# Patient Record
Sex: MALE | Race: WHITE | NOT HISPANIC OR LATINO | URBAN - NONMETROPOLITAN AREA
[De-identification: names, ages, dates, MRNs, and addresses within clinical notes are randomized per-mention and may not be internally consistent; named-entity substitution may affect disease eponyms.]

---

## 2021-11-01 ENCOUNTER — IMPORTED ENCOUNTER (OUTPATIENT)
Dept: URBAN - NONMETROPOLITAN AREA CLINIC 1 | Facility: CLINIC | Age: 71
End: 2021-11-01

## 2021-11-01 PROBLEM — H25.813: Noted: 2021-11-01

## 2021-11-01 PROCEDURE — 99204 OFFICE O/P NEW MOD 45 MIN: CPT

## 2021-11-01 NOTE — PATIENT DISCUSSION
Cataract(s)-Visually significant cataract OU .-Cataract(s) causing symptomatic impairment of visual function not correctable with a tolerable change in glasses or contact lenses lighting or non-operative means resulting in specific activity limitations and/or participation restrictions including but not limited to reading viewing television driving or meeting vocational or recreational needs. -Expectation is clearer vision and functional improvement in symptoms as well as reduced glare disability after cataract removal.-Order IOLMaster and OPD today. -Recommend LRI OD/TORIC OS  based on today's OPD testing and lifestyle questionnaire.-All questions were answered regarding surgery including pre and post-op medications appointments activity restrictions and anesthetic usage.-The risks benefits and alternatives and special risk factors for the patient were discussed in detail including but not limited to: bleeding infection retinal detachment vitreous loss problems with the implant and possible need for additional surgery.-Although rare the possibility of complete vision loss was discussed.-The possible need for glasses post-operatively was discussed.-Order medical clearance exam based on history of cancer -Patient elects to proceed with cataract surgery OS . Will schedule at patient's convenience and re-evaluate OD  in the future. Discussed lens & based off todays testing pt qualifies for LRI OD /Toric OS. Explained lens benefits & advised pt the need for reading gls. Start AT & Lotemax TID OU x 1 week and RTC for repeat rocael OU. Drops given today w/ instructions Pt elects LenSX OU Post op inflammation anticipated discussed dextenzax insertiona fter surgery.

## 2021-11-18 ENCOUNTER — IMPORTED ENCOUNTER (OUTPATIENT)
Dept: URBAN - NONMETROPOLITAN AREA CLINIC 1 | Facility: CLINIC | Age: 71
End: 2021-11-18

## 2021-11-18 PROBLEM — A15.9: Noted: 2021-11-18

## 2021-11-18 PROBLEM — Z01.818: Noted: 2021-11-18

## 2021-12-03 ENCOUNTER — IMPORTED ENCOUNTER (OUTPATIENT)
Dept: URBAN - NONMETROPOLITAN AREA CLINIC 1 | Facility: CLINIC | Age: 71
End: 2021-12-03

## 2021-12-03 PROBLEM — H25.811: Noted: 2021-12-03

## 2021-12-03 PROBLEM — Z98.42: Noted: 2021-12-03

## 2021-12-03 PROCEDURE — 99024 POSTOP FOLLOW-UP VISIT: CPT

## 2021-12-03 PROCEDURE — 66999 UNLISTED PX ANT SEGMENT EYE: CPT

## 2021-12-03 PROCEDURE — 92136 OPHTHALMIC BIOMETRY: CPT

## 2021-12-03 PROCEDURE — 0356T INSERTION OF DRUG-ELUTING IMPLANT (INCLUDING PUNCTAL DILATION AND IMPLANT REMOVAL WHEN PERFORMED) INTO LACRIMAL CANALICULUS, EACH: CPT

## 2021-12-03 PROCEDURE — 66984 XCAPSL CTRC RMVL W/O ECP: CPT

## 2021-12-03 NOTE — PATIENT DISCUSSION
s/p PCIOL-Pt doing well s/p PCIOL. -Continue post-op gtts according to instruction sheet and sleep with eye shield over eye for 7 nights.-Avoid bending at the waist lifting anything over 5lbs and dirty or neal environments. - IOP at 32 OS start Combigan BID OS sample given today. -RTC .

## 2021-12-06 ENCOUNTER — IMPORTED ENCOUNTER (OUTPATIENT)
Dept: URBAN - NONMETROPOLITAN AREA CLINIC 1 | Facility: CLINIC | Age: 71
End: 2021-12-06

## 2021-12-06 PROCEDURE — 99024 POSTOP FOLLOW-UP VISIT: CPT

## 2021-12-06 NOTE — PATIENT DISCUSSION
Cataract(s)-Visually significant cataract OD . -Cataract(s) causing symptomatic impairment of visual function not correctable with a tolerable change in glasses or contact lenses lighting or non-operative means resulting in specific activity limitations and/or participation restrictions including but not limited to reading viewing television driving or meeting vocational or recreational needs. -Expectation is clearer vision and functional improvement in symptoms as well as reduced glare disability after cataract removal.-Recommend LRI based on previous OPD testing and lifestyle questionnaire.-All questions were answered regarding surgery including pre and post-op medications appointments activity restrictions and anesthetic usage.-The risks benefits and alternatives and special risk factors for the patient were discussed in detail including but not limited to: bleeding infection retinal detachment vitreous loss problems with the implant and possible need for additional surgery.-Although rare the possibility of complete vision loss was discussed.-The need for glasses post-operatively was discussed.-Patient elects to proceed with cataract surgery OD . Will schedule at patient's convenience. s/p PCIOL CE OS LRI/LenSx -Pt doing well s/p PCIOL. -Continue post-op gtts according to instruction sheet.-Okay to resume usual activites and d/c eye shield.

## 2021-12-13 PROBLEM — Z98.42: Noted: 2021-12-13

## 2021-12-13 PROBLEM — H25.811: Noted: 2021-12-13

## 2021-12-17 ENCOUNTER — IMPORTED ENCOUNTER (OUTPATIENT)
Dept: URBAN - NONMETROPOLITAN AREA CLINIC 1 | Facility: CLINIC | Age: 71
End: 2021-12-17

## 2021-12-17 PROBLEM — Z98.41: Noted: 2021-12-17

## 2021-12-17 PROBLEM — Z98.42: Noted: 2021-12-13

## 2021-12-17 PROCEDURE — 66999 UNLISTED PX ANT SEGMENT EYE: CPT

## 2021-12-17 PROCEDURE — 92136 OPHTHALMIC BIOMETRY: CPT

## 2021-12-17 PROCEDURE — 99024 POSTOP FOLLOW-UP VISIT: CPT

## 2021-12-17 PROCEDURE — 0356T INSERTION OF DRUG-ELUTING IMPLANT (INCLUDING PUNCTAL DILATION AND IMPLANT REMOVAL WHEN PERFORMED) INTO LACRIMAL CANALICULUS, EACH: CPT

## 2021-12-17 PROCEDURE — 66984 XCAPSL CTRC RMVL W/O ECP: CPT

## 2021-12-17 NOTE — PATIENT DISCUSSION
Same day POV CE OD 12/17/21 POV CE OS 12/3/21 LRI/LenSx+Dextenza OU-Pt doing well s/p PCIOL. -Continue post-op gtts according to instruction sheet and sleep with eye shield over eye for 7 nights.-Avoid bending at the waist lifting anything over 5lbs and dirty or neal environments. -RTC 1 week POV with Dr. Jj Malhotra

## 2022-04-10 ASSESSMENT — VISUAL ACUITY
OD_CC: 20/80
OD_CC: 20/40
OD_PAM: 20/25
OS_CC: 20/20
OD_PH: 20/30
OS_AM: 20/25
OS_CC: 20/30
OD_CC: 20/40
OS_AM: 20/25
OD_PAM: 20/25
OS_SC: 20/40
OD_PH: 20/30
OD_GLARE: 20/40
OD_GLARE: 20/40
OS_CC: 20/25-2
OD_SC: 20/40
OS_GLARE: 20/200
OS_CC: 20/70
OS_GLARE: 20/200
OD_CC: 20/30
OS_CC: 20/40
OS_SC: 20/50
OS_PH: 20/40
OD_GLARE: 20/40
OD_PAM: 20/25

## 2022-04-10 ASSESSMENT — TONOMETRY
OS_IOP_MMHG: 32
OS_IOP_MMHG: 12
OD_IOP_MMHG: 15
OD_IOP_MMHG: 26
OS_IOP_MMHG: 23
OS_IOP_MMHG: 16
OD_IOP_MMHG: 20
OD_IOP_MMHG: 10

## 2025-06-09 NOTE — PATIENT DISCUSSION
Called and spoke to pt, informed of abx sent to pharmacy. Reviewed instructions for use. Pt appreciative of c/b. Encouraged pt to reach out with any additional questions or concerns.    Medical Clearance-Medical clearance done today. -No outstanding concerns that would preclude surgery.-Patient is cleared to proceed with scheduled surgery.